# Patient Record
Sex: MALE | NOT HISPANIC OR LATINO | Employment: FULL TIME | URBAN - METROPOLITAN AREA
[De-identification: names, ages, dates, MRNs, and addresses within clinical notes are randomized per-mention and may not be internally consistent; named-entity substitution may affect disease eponyms.]

---

## 2017-03-17 ENCOUNTER — OFFICE VISIT (OUTPATIENT)
Dept: MEDICAL GROUP | Facility: MEDICAL CENTER | Age: 27
End: 2017-03-17
Payer: OTHER MISCELLANEOUS

## 2017-03-17 VITALS
TEMPERATURE: 98.1 F | RESPIRATION RATE: 16 BRPM | BODY MASS INDEX: 28.6 KG/M2 | HEART RATE: 68 BPM | OXYGEN SATURATION: 99 % | WEIGHT: 230 LBS | SYSTOLIC BLOOD PRESSURE: 122 MMHG | DIASTOLIC BLOOD PRESSURE: 64 MMHG | HEIGHT: 75 IN

## 2017-03-17 DIAGNOSIS — M54.9 UPPER BACK PAIN: ICD-10-CM

## 2017-03-17 PROCEDURE — 99214 OFFICE O/P EST MOD 30 MIN: CPT | Performed by: NURSE PRACTITIONER

## 2017-03-17 RX ORDER — CYCLOBENZAPRINE HCL 5 MG
5-10 TABLET ORAL NIGHTLY PRN
Qty: 20 TAB | Refills: 0 | Status: SHIPPED | OUTPATIENT
Start: 2017-03-17 | End: 2019-08-20

## 2017-03-17 NOTE — MR AVS SNAPSHOT
"Lebron Mcdonough   3/17/2017 10:00 AM   Office Visit   MRN: [id number]    Department:  [facility]   Dept Phone:  [phone]    Description:  Male : 1990   Provider:  ELIANA Ramírez           Reason for Visit     Back Pain mid upper back      Allergies as of 3/17/2017     No Known Allergies      Vital Signs     Blood Pressure Pulse Temperature Respirations Height Weight    122/64 mmHg 68 36.7 °C (98.1 °F) 16 1.905 m (6' 3\") 104.327 kg (230 lb)    Body Mass Index Oxygen Saturation Smoking Status             28.75 kg/m2 99% Former Smoker         Basic Information     Date Of Birth Sex Race Ethnicity Preferred Language    1990 Male Unknown Non- English      Problem List              ICD-10-CM Priority Class Noted - Resolved    Nausea R11.0   2016 - Present    Medical marijuana use Z79.899   2016 - Present    Quit smoking Z87.898   2016 - Present    Anxiety and depression F41.9, F32.9   2016 - Present    Gastroesophageal reflux disease without esophagitis K21.9   2016 - Present      Health Maintenance        Date Due Completion Dates    IMM DTaP/Tdap/Td Vaccine (1 - Tdap) 10/6/2007 ---    IMM INFLUENZA (1) 2016 ---            Current Immunizations     No immunizations on file.      Below and/or attached are the medications your provider expects you to take. Review all of your home medications and newly ordered medications with your provider and/or pharmacist. Follow medication instructions as directed by your provider and/or pharmacist. Please keep your medication list with you and share with your provider. Update the information when medications are discontinued, doses are changed, or new medications (including over-the-counter products) are added; and carry medication information at all times in the event of emergency situations     Allergies:  No Known Allergies          Medications  Valid as of: 2017 - 10:12 AM    Generic Name Brand Name " Tablet Size Instructions for use    Citalopram Hydrobromide (Tab) CELEXA 10 MG Take 1 Tab by mouth every day.        Ondansetron (TABLET DISPERSIBLE) ZOFRAN ODT 4 MG Take 1 Tab by mouth every 8 hours as needed for Nausea/Vomiting.        .                 Medicines prescribed today were sent to:     [facility], [place] - [address]    [address] [address]    Phone: [phone] Fax: [phone]    Open 24 Hours?: No      Medication refill instructions:       If your prescription bottle indicates you have medication refills left, it is not necessary to call your provider’s office. Please contact your pharmacy and they will refill your medication.    If your prescription bottle indicates you do not have any refills left, you may request refills at any time through one of the following ways: The online [employer] system (except Urgent Care), by calling your provider’s office, or by asking your pharmacy to contact your provider’s office with a refill request. Medication refills are processed only during regular business hours and may not be available until the next business day. Your provider may request additional information or to have a follow-up visit with you prior to refilling your medication.   *Please Note: Medication refills are assigned a new Rx number when refilled electronically. Your pharmacy may indicate that no refills were authorized even though a new prescription for the same medication is available at the pharmacy. Please request the medicine by name with the pharmacy before contacting your provider for a refill.           Barcoding Access Code: Activation code not generated  Current Barcoding Status: Active

## 2017-04-12 ENCOUNTER — HOSPITAL ENCOUNTER (OUTPATIENT)
Dept: RADIOLOGY | Facility: MEDICAL CENTER | Age: 27
End: 2017-04-12
Attending: NURSE PRACTITIONER
Payer: OTHER MISCELLANEOUS

## 2017-04-12 ENCOUNTER — PATIENT MESSAGE (OUTPATIENT)
Dept: MEDICAL GROUP | Facility: MEDICAL CENTER | Age: 27
End: 2017-04-12

## 2017-04-12 DIAGNOSIS — M54.9 UPPER BACK PAIN: ICD-10-CM

## 2017-04-12 PROCEDURE — 72070 X-RAY EXAM THORAC SPINE 2VWS: CPT

## 2017-04-12 NOTE — TELEPHONE ENCOUNTER
From: Lebron Mcdonough  To: ELIANA Ramírez  Sent: 4/12/2017 10:01 AM PDT  Subject: Non-Urgent Medical Question    Good morning Dr. Dickey,    My back is still really bothering me in the same spot. Still no searing hot/shooting pain but something isn't right. Should I come see you? Thank you for your time.        Lebron Mcdonough

## 2017-04-18 ENCOUNTER — PATIENT MESSAGE (OUTPATIENT)
Dept: MEDICAL GROUP | Facility: MEDICAL CENTER | Age: 27
End: 2017-04-18

## 2017-04-18 NOTE — TELEPHONE ENCOUNTER
From: Lebron Mcdonough  To: ELIANA Ramírez  Sent: 4/18/2017 9:47 AM PDT  Subject: Non-Urgent Medical Question    Hello Dr. Dickey,    Unfortunately I had to cancel my physical therapy appointment as my insurance does not cover any cost until I reach my $2,900 deductible and I don't have $318.00 dollars to pay out of pocket for each visit. I will do the best I can from home and hopefully be able to afford some visits in the future. Thank you for responding to me so quickly.      Lebron

## 2017-04-19 ENCOUNTER — APPOINTMENT (OUTPATIENT)
Dept: PHYSICAL THERAPY | Facility: REHABILITATION | Age: 27
End: 2017-04-19
Attending: NURSE PRACTITIONER
Payer: OTHER MISCELLANEOUS

## 2017-04-21 ENCOUNTER — APPOINTMENT (OUTPATIENT)
Dept: PHYSICAL THERAPY | Facility: REHABILITATION | Age: 27
End: 2017-04-21
Attending: NURSE PRACTITIONER
Payer: OTHER MISCELLANEOUS

## 2017-04-25 ENCOUNTER — APPOINTMENT (OUTPATIENT)
Dept: PHYSICAL THERAPY | Facility: REHABILITATION | Age: 27
End: 2017-04-25
Attending: NURSE PRACTITIONER
Payer: OTHER MISCELLANEOUS

## 2017-04-27 ENCOUNTER — APPOINTMENT (OUTPATIENT)
Dept: PHYSICAL THERAPY | Facility: REHABILITATION | Age: 27
End: 2017-04-27
Attending: NURSE PRACTITIONER
Payer: OTHER MISCELLANEOUS

## 2017-05-02 ENCOUNTER — APPOINTMENT (OUTPATIENT)
Dept: PHYSICAL THERAPY | Facility: REHABILITATION | Age: 27
End: 2017-05-02
Attending: NURSE PRACTITIONER
Payer: OTHER MISCELLANEOUS

## 2017-05-04 ENCOUNTER — APPOINTMENT (OUTPATIENT)
Dept: PHYSICAL THERAPY | Facility: REHABILITATION | Age: 27
End: 2017-05-04
Attending: NURSE PRACTITIONER
Payer: OTHER MISCELLANEOUS

## 2017-05-09 ENCOUNTER — APPOINTMENT (OUTPATIENT)
Dept: PHYSICAL THERAPY | Facility: REHABILITATION | Age: 27
End: 2017-05-09
Attending: NURSE PRACTITIONER
Payer: OTHER MISCELLANEOUS

## 2017-05-11 ENCOUNTER — APPOINTMENT (OUTPATIENT)
Dept: PHYSICAL THERAPY | Facility: REHABILITATION | Age: 27
End: 2017-05-11
Attending: NURSE PRACTITIONER
Payer: OTHER MISCELLANEOUS

## 2017-07-24 RX ORDER — CITALOPRAM HYDROBROMIDE 10 MG/1
TABLET ORAL
Qty: 30 TAB | Refills: 11 | Status: SHIPPED | OUTPATIENT
Start: 2017-07-24 | End: 2017-08-10 | Stop reason: SDUPTHER

## 2017-07-24 NOTE — TELEPHONE ENCOUNTER
Was the patient seen in the last year in this department? Yes     Does patient have an active prescription for medications requested? No     Received Request Via: Pharmacy     Last seen: 03/17/2017 Noble

## 2017-08-10 ENCOUNTER — OFFICE VISIT (OUTPATIENT)
Dept: MEDICAL GROUP | Facility: MEDICAL CENTER | Age: 27
End: 2017-08-10
Payer: COMMERCIAL

## 2017-08-10 VITALS
DIASTOLIC BLOOD PRESSURE: 76 MMHG | SYSTOLIC BLOOD PRESSURE: 124 MMHG | OXYGEN SATURATION: 96 % | RESPIRATION RATE: 16 BRPM | HEIGHT: 75 IN | WEIGHT: 229 LBS | BODY MASS INDEX: 28.47 KG/M2 | HEART RATE: 62 BPM | TEMPERATURE: 98 F

## 2017-08-10 DIAGNOSIS — Z72.0 TOBACCO USE: ICD-10-CM

## 2017-08-10 DIAGNOSIS — Z23 NEED FOR VACCINATION: ICD-10-CM

## 2017-08-10 DIAGNOSIS — F32.A ANXIETY AND DEPRESSION: ICD-10-CM

## 2017-08-10 DIAGNOSIS — Z00.00 ANNUAL PHYSICAL EXAM: ICD-10-CM

## 2017-08-10 DIAGNOSIS — F41.9 ANXIETY AND DEPRESSION: ICD-10-CM

## 2017-08-10 DIAGNOSIS — Z91.09 ENVIRONMENTAL ALLERGIES: ICD-10-CM

## 2017-08-10 PROCEDURE — 90471 IMMUNIZATION ADMIN: CPT | Performed by: NURSE PRACTITIONER

## 2017-08-10 PROCEDURE — 99999 PR NO CHARGE: CPT | Performed by: NURSE PRACTITIONER

## 2017-08-10 PROCEDURE — 90715 TDAP VACCINE 7 YRS/> IM: CPT | Performed by: NURSE PRACTITIONER

## 2017-08-10 PROCEDURE — 99395 PREV VISIT EST AGE 18-39: CPT | Mod: 25 | Performed by: NURSE PRACTITIONER

## 2017-08-10 RX ORDER — CITALOPRAM HYDROBROMIDE 10 MG/1
10 TABLET ORAL DAILY
Qty: 30 TAB | Refills: 11 | Status: SHIPPED | OUTPATIENT
Start: 2017-08-10 | End: 2019-08-20

## 2017-08-10 ASSESSMENT — PATIENT HEALTH QUESTIONNAIRE - PHQ9: CLINICAL INTERPRETATION OF PHQ2 SCORE: 0

## 2017-08-10 NOTE — MR AVS SNAPSHOT
"Lebron Mcdonough   8/10/2017 2:20 PM   Office Visit   MRN: [id number]    Department:  [facility]   Dept Phone:  [phone]    Description:  Male : 1990   Provider:  ELIANA Ramírez           Reason for Visit     Annual Exam           Allergies as of 8/10/2017     No Known Allergies      You were diagnosed with     Annual physical exam   [190384]       Anxiety and depression   [948964]       Tobacco use   [866102]       Environmental allergies   [598175]       Need for vaccination   [871689]         Vital Signs     Blood Pressure Pulse Temperature Respirations Height Weight    124/76 mmHg 62 36.7 °C (98 °F) 16 1.905 m (6' 3\") 103.874 kg (229 lb)    Body Mass Index Oxygen Saturation Smoking Status             28.62 kg/m2 96% Current Some Day Smoker         Basic Information     Date Of Birth Sex Race Ethnicity Preferred Language    1990 Male Unknown Non- English      Problem List              ICD-10-CM Priority Class Noted - Resolved    Medical marijuana use Z79.899   2016 - Present    Anxiety and depression F41.9, F32.9   2016 - Present    Gastroesophageal reflux disease without esophagitis K21.9   2016 - Present    Tobacco use Z72.0   8/10/2017 - Present    Environmental allergies Z91.09   8/10/2017 - Present      Health Maintenance        Date Due Completion Dates    IMM DTaP/Tdap/Td Vaccine (2 - Tdap) 10/6/2007 2004, 1994, 1990, 1989, 3/6/1989, 1988    IMM INFLUENZA (1) 2017 ---            Current Immunizations     DTP 1994, 1990, 1989, 3/6/1989, 1988    Hepatitis A Vaccine, Ped/Adol 2007    Hepatitis B Vaccine Non-Recombivax (Ped/Adol) 2007, 2004    Hib Vaccine (Prp-d) Historical Data 1990    MMR Vaccine 1994, 1990    OPV - Historical Data 1994, 1990, 3/6/1989, 1988    TD Vaccine 2004    Tdap Vaccine 8/10/2017      Below and/or attached are the medications your " provider expects you to take. Review all of your home medications and newly ordered medications with your provider and/or pharmacist. Follow medication instructions as directed by your provider and/or pharmacist. Please keep your medication list with you and share with your provider. Update the information when medications are discontinued, doses are changed, or new medications (including over-the-counter products) are added; and carry medication information at all times in the event of emergency situations     Allergies:  No Known Allergies          Medications  Valid as of: August 10, 2017 -  3:53 PM    Generic Name Brand Name Tablet Size Instructions for use    Citalopram Hydrobromide (Tab) CELEXA 10 MG Take 1 Tab by mouth every day.        Cyclobenzaprine HCl (Tab) FLEXERIL 5 MG Take 1-2 Tabs by mouth at bedtime as needed.        Ondansetron (TABLET DISPERSIBLE) ZOFRAN ODT 4 MG Take 1 Tab by mouth every 8 hours as needed for Nausea/Vomiting.        .                 Medicines prescribed today were sent to:     [id number] - [place], [place] - [address] [address]    Phone: [phone] Fax: [phone]    Open 24 Hours?: No      Medication refill instructions:       If your prescription bottle indicates you have medication refills left, it is not necessary to call your provider’s office. Please contact your pharmacy and they will refill your medication.    If your prescription bottle indicates you do not have any refills left, you may request refills at any time through one of the following ways: The online [employer] system (except Urgent Care), by calling your provider’s office, or by asking your pharmacy to contact your provider’s office with a refill request. Medication refills are processed only during regular business hours and may not be available until the next business day. Your provider may request additional information or to have a follow-up visit  with you prior to refilling your medication.   *Please Note: Medication refills are assigned a new Rx number when refilled electronically. Your pharmacy may indicate that no refills were authorized even though a new prescription for the same medication is available at the pharmacy. Please request the medicine by name with the pharmacy before contacting your provider for a refill.        Your To Do List     Future Labs/Procedures Complete By Expires    CBC WITH DIFFERENTIAL  As directed 8/11/2018    COMP METABOLIC PANEL  As directed 8/11/2018    LIPID PROFILE  As directed 8/11/2018    TSH WITH REFLEX TO FT4  As directed 8/10/2018         MyChart Access Code: Activation code not generated  Current MyChart Status: Active          Quit Tobacco Information     Do you want to quit using tobacco?    Quitting tobacco decreases risks of cancer, heart and lung disease, increases life expectancy, improves sense of taste and smell, and increases spending money, among other benefits.    If you are thinking about quitting, we can help.  • [employer] Quit Tobacco Program: 280.332.2094  o Program occurs weekly for four weeks and includes pharmacist consultation on products to support quitting smoking or chewing tobacco. A provider referral is needed for pharmacist consultation.  • Tobacco Users Help Hotline: 6-018-QUIT-NOW (585-9722) or https://[place].quitlogix.org/  o Free, confidential telephone and online coaching for [place] residents. Sessions are designed on a schedule that is convenient for you. Eligible clients receive free nicotine replacement therapy.  • Nationally: www.smokefree.gov  o Information and professional assistance to support both immediate and long-term needs as you become, and remain, a non-smoker. Smokefree.gov allows you to choose the help that best fits your needs.

## 2017-08-10 NOTE — PROGRESS NOTES
Chief Complaint   Patient presents with   • Annual Exam     Lebron Mcdonough is a 26 y.o. male here for annual exam, has no acute complaints. Has not been exercising much recently, admits that his diet could use improvement. He continues working at [employer]. We discussed:  Anxiety and depression  Continues with celexa 10 mg daily  Mood has been stable, less irritable  Anxiety has been has been good  Sleeping well, normal appetite  Tobacco use  Has quit completely for several months but recently has started smoking again on occ, usually if out having drinks- once or twice weekly  Environmental allergies  Nasal congestion, rhinitis  Using occ OTC allergy medication with relief      Current medicines (including changes today)  Current Outpatient Prescriptions   Medication Sig Dispense Refill   • citalopram (CELEXA) 10 MG tablet Take 1 Tab by mouth every day. 30 Tab 11   • cyclobenzaprine (FLEXERIL) 5 MG tablet Take 1-2 Tabs by mouth at bedtime as needed. 20 Tab 0   • ondansetron (ZOFRAN ODT) 4 MG TABLET DISPERSIBLE Take 1 Tab by mouth every 8 hours as needed for Nausea/Vomiting. 10 Tab 0     No current facility-administered medications for this visit.     He  has no past medical history of Asthma, Hyperlipidemia, or Hypertension.  He  has past surgical history that includes tonsillectomy and adenoidectomy and hernia repair.  Social History   Substance Use Topics   • Smoking status: Current Some Day Smoker -- 0.50 packs/day for 8 years     Types: Cigarettes   • Smokeless tobacco: Never Used   • Alcohol Use: 3.0 oz/week     6 Cans of beer per week     Social History     Social History Narrative     Family History   Problem Relation Age of Onset   • Diabetes Mother    • Other Maternal Grandfather      ALS     Family Status   Relation Status Death Age   • Mother Alive    • Father Alive    • Brother Alive    • Brother Alive    • Brother Alive    • Maternal Grandmother Alive    • Maternal Grandfather     •  "Paternal Grandmother Alive    • Paternal Grandfather Alive          ROS  Problems listed discussed above, all other systems reviewed and negative     Objective:     Blood pressure 124/76, pulse 62, temperature 36.7 °C (98 °F), resp. rate 16, height 1.905 m (6' 3\"), weight 103.874 kg (229 lb), SpO2 96 %. Body mass index is 28.62 kg/(m^2).  Physical Exam:  General: Alert, oriented in no acute distress.  Eye contact is good, speech is normal, affect calm  HEENT: perrl, Oral mucosa pink moist, no lesions. Nares patent. TMs gray with good landmarks bilaterally. No cervical or supraclavicular lymphadenopathy, thyroid isthmus palpable without masses or nodules.  Lungs: clear to auscultation bilaterally, good aeration, normal effort. No wheeze/ rhonchi/ rales.  CV: regular rate and rhythm, S1, S2. No murmur, no JVD, no edema.  Pedal pulses 2 + bilaterally  Abdomen: soft, nontender, BS x4, no hepatosplenomegaly.  Ext: color normal, vascularity normal, temperature normal. No rash or lesions.  MS: no point tenderness over spine, no obvious deformity. No joint swelling or redness. Strength is 5/5 globally  Neuro: DTR 2+ bilaterally  Assessment and Plan:   The following treatment plan was discussed  1. Annual physical exam   normal physical exam. Health promotion topics reviewed including healthy diet, regular exercise, sleep weight loss recommended. 2000 international units vitamin D3 advise daily. Tdap updated  COMP METABOLIC PANEL    LIPID PROFILE    CBC WITH DIFFERENTIAL    TSH WITH REFLEX TO FT4   2. Anxiety and depression   stable, continue citalopram    3. Tobacco use   recently started smoking socially again. Cessation encouraged    4. Environmental allergies   stable    5. Need for vaccination  I have placed the below orders and discussed them with an approved delegating provider. The MA is performing the below orders under the direction of Dr. Queen  Tdap =>6yo IM       Educated in proper administration of " medication(s) ordered today including safety, possible SE, risks, benefits, rationale and alternatives to therapy.     Followup: annually and pending labs             Please note that this dictation was created using voice recognition software. I have worked with consultants from the vendor as well as technical experts from [facility] to optimize the interface. I have made every reasonable attempt to correct obvious errors, but I expect that there are errors of grammar and possibly content that I did not discover before finalizing the note.

## 2017-08-10 NOTE — ASSESSMENT & PLAN NOTE
Continues with celexa 10 mg daily  Mood has been stable, less irritable  Anxiety has been has been good  Sleeping well, normal appetite

## 2017-08-10 NOTE — ASSESSMENT & PLAN NOTE
Has quit completely for several months but recently has started smoking again on occ, usually if out having drinks

## 2018-08-17 ENCOUNTER — HOSPITAL ENCOUNTER (OUTPATIENT)
Dept: LAB | Facility: MEDICAL CENTER | Age: 28
End: 2018-08-17
Attending: NURSE PRACTITIONER
Payer: COMMERCIAL

## 2018-08-17 ENCOUNTER — OFFICE VISIT (OUTPATIENT)
Dept: MEDICAL GROUP | Facility: MEDICAL CENTER | Age: 28
End: 2018-08-17
Payer: COMMERCIAL

## 2018-08-17 VITALS
WEIGHT: 218 LBS | TEMPERATURE: 97.5 F | OXYGEN SATURATION: 97 % | SYSTOLIC BLOOD PRESSURE: 112 MMHG | DIASTOLIC BLOOD PRESSURE: 80 MMHG | BODY MASS INDEX: 27.1 KG/M2 | HEIGHT: 75 IN | HEART RATE: 68 BPM | RESPIRATION RATE: 16 BRPM

## 2018-08-17 DIAGNOSIS — Z00.00 ANNUAL PHYSICAL EXAM: ICD-10-CM

## 2018-08-17 DIAGNOSIS — F41.9 ANXIETY AND DEPRESSION: ICD-10-CM

## 2018-08-17 DIAGNOSIS — F32.A ANXIETY AND DEPRESSION: ICD-10-CM

## 2018-08-17 LAB
ALBUMIN SERPL BCP-MCNC: 4.5 G/DL (ref 3.2–4.9)
ALBUMIN/GLOB SERPL: 1.7 G/DL
ALP SERPL-CCNC: 51 U/L (ref 30–99)
ALT SERPL-CCNC: 35 U/L (ref 2–50)
ANION GAP SERPL CALC-SCNC: 6 MMOL/L (ref 0–11.9)
AST SERPL-CCNC: 22 U/L (ref 12–45)
BILIRUB SERPL-MCNC: 0.6 MG/DL (ref 0.1–1.5)
BUN SERPL-MCNC: 15 MG/DL (ref 8–22)
CALCIUM SERPL-MCNC: 9.8 MG/DL (ref 8.5–10.5)
CHLORIDE SERPL-SCNC: 105 MMOL/L (ref 96–112)
CHOLEST SERPL-MCNC: 146 MG/DL (ref 100–199)
CO2 SERPL-SCNC: 27 MMOL/L (ref 20–33)
CREAT SERPL-MCNC: 1.02 MG/DL (ref 0.5–1.4)
GLOBULIN SER CALC-MCNC: 2.6 G/DL (ref 1.9–3.5)
GLUCOSE SERPL-MCNC: 95 MG/DL (ref 65–99)
HDLC SERPL-MCNC: 40 MG/DL
LDLC SERPL CALC-MCNC: 91 MG/DL
POTASSIUM SERPL-SCNC: 4.2 MMOL/L (ref 3.6–5.5)
PROT SERPL-MCNC: 7.1 G/DL (ref 6–8.2)
SODIUM SERPL-SCNC: 138 MMOL/L (ref 135–145)
TRIGL SERPL-MCNC: 74 MG/DL (ref 0–149)
TSH SERPL DL<=0.005 MIU/L-ACNC: 1.58 UIU/ML (ref 0.38–5.33)

## 2018-08-17 PROCEDURE — 84443 ASSAY THYROID STIM HORMONE: CPT

## 2018-08-17 PROCEDURE — 80061 LIPID PANEL: CPT

## 2018-08-17 PROCEDURE — 80053 COMPREHEN METABOLIC PANEL: CPT

## 2018-08-17 PROCEDURE — 99395 PREV VISIT EST AGE 18-39: CPT | Performed by: NURSE PRACTITIONER

## 2018-08-17 PROCEDURE — 36415 COLL VENOUS BLD VENIPUNCTURE: CPT

## 2018-08-17 RX ORDER — ESCITALOPRAM OXALATE 10 MG/1
10 TABLET ORAL DAILY
Qty: 30 TAB | Refills: 1 | Status: SHIPPED | OUTPATIENT
Start: 2018-08-17 | End: 2019-08-20

## 2018-08-17 ASSESSMENT — PATIENT HEALTH QUESTIONNAIRE - PHQ9
SUM OF ALL RESPONSES TO PHQ QUESTIONS 1-9: 7
CLINICAL INTERPRETATION OF PHQ2 SCORE: 3
5. POOR APPETITE OR OVEREATING: 0 - NOT AT ALL

## 2018-08-17 NOTE — PROGRESS NOTES
"Chief Complaint   Patient presents with   • Annual Exam     Lebron Mcdonough is a 27 y.o. male established patient here for annual exam.  He is living with a girlfriend, active but no planned exercise.  Generally healthy diet.  He continues working at [employer].  We discussed:    Anxiety and depression  Discontinued celexa, did not feel it was helping or that he needed it at the time. Mood has been lower, tends to \"over analyze\", can be stressed at times, feels unmotivated are easily overwhelmed.  Interested in trying alternative medication   no SI/HI, no history of manic behavior, no insomnia or appetite changes      Current medicines (including changes today)  Current Outpatient Prescriptions   Medication Sig Dispense Refill   • escitalopram (LEXAPRO) 10 MG Tab Take 1 Tab by mouth every day. 30 Tab 1   • citalopram (CELEXA) 10 MG tablet Take 1 Tab by mouth every day. 30 Tab 11   • cyclobenzaprine (FLEXERIL) 5 MG tablet Take 1-2 Tabs by mouth at bedtime as needed. 20 Tab 0   • ondansetron (ZOFRAN ODT) 4 MG TABLET DISPERSIBLE Take 1 Tab by mouth every 8 hours as needed for Nausea/Vomiting. 10 Tab 0     No current facility-administered medications for this visit.      He  has no past medical history of Asthma; Hyperlipidemia; or Hypertension.  He  has a past surgical history that includes tonsillectomy and adenoidectomy and hernia repair.  Social History   Substance Use Topics   • Smoking status: Current Some Day Smoker     Packs/day: 0.50     Years: 8.00     Types: Cigarettes   • Smokeless tobacco: Never Used   • Alcohol use 3.0 oz/week     6 Cans of beer per week     Social History     Social History Narrative   • No narrative on file     Family History   Problem Relation Age of Onset   • Diabetes Mother    • Other Maternal Grandfather         ALS     Family Status   Relation Status   • Mo Alive   • Fa Alive   • Bro Alive   • Bro Alive   • Bro Alive   • MGMo Alive   • MGFa    • PGMo Alive   • PGFa Alive " "        ROS  Problems listed discussed above, all other systems reviewed and negative     Objective:     Blood pressure 112/80, pulse 68, temperature 36.4 °C (97.5 °F), resp. rate 16, height 1.905 m (6' 3\"), weight 98.9 kg (218 lb), SpO2 97 %. Body mass index is 27.25 kg/m².  Physical Exam:  General: Alert, oriented in no acute distress.  Eye contact is good, speech is normal, affect calm  HEENT: perrl, Oral mucosa pink moist, no lesions. Nares patent. TMs gray with good landmarks bilaterally. No cervical or supraclavicular lymphadenopathy, thyroid isthmus palpable without masses or nodules.  Lungs: clear to auscultation bilaterally, good aeration, normal effort. No wheeze/ rhonchi/ rales.  CV: regular rate and rhythm, S1, S2. No murmur, no JVD, no edema. PMI at 5th intercostal space midclavicular line. Pedal pulses 2 + bilaterally  Abdomen: soft, nontender, BS x4, no hepatosplenomegaly.  Ext: color normal, vascularity normal, temperature normal. No rash or lesions.  MS: No joint swelling or redness. Strength is 5/5 globally  Neuro: DTR 2+ bilaterally  Assessment and Plan:   The following treatment plan was discussed   1. Annual physical exam  Normal physical exam. General health and wellness discussion including healthy diet, regular exercise. 2000 iu Vit d3 advised daily. Preventative health screenings as listed below. Advised regular dental cleanings, eye exam yearly.  TSH WITH REFLEX TO FT4    LIPID PROFILE    COMP METABOLIC PANEL   2. Anxiety and depression   on Celexa in the past, discontinued several months ago.  Interested in alternative medication options.  Will start trial of Lexapro.  Risks benefits and side effects discussed.  Discussed need for sustained therapy to obtain results.  Phone numbers given for counseling services.  Patient to contact me in 1 month with an update, follow-up in the office if still struggling  escitalopram (LEXAPRO) 10 MG Tab       Educated in proper administration of " medication(s) ordered today including safety, possible SE, risks, benefits, rationale and alternatives to therapy.     Followup: Annually and pending labs             Please note that this dictation was created using voice recognition software. I have worked with consultants from the vendor as well as technical experts from [facility] to optimize the interface. I have made every reasonable attempt to correct obvious errors, but I expect that there are errors of grammar and possibly content that I did not discover before finalizing the note.

## 2018-08-17 NOTE — ASSESSMENT & PLAN NOTE
"Discontinued celexa, did not feel it was helping or that he needed it at the time. Mood has been lower, tends to \"over analyze\", can be stressed at times, feels unmotivated are easily overwhelmed.  Interested in trying alternative medication   no SI/HI, no history of manic behavior, no insomnia or appetite changes    "

## 2018-09-21 ENCOUNTER — PATIENT MESSAGE (OUTPATIENT)
Dept: MEDICAL GROUP | Facility: MEDICAL CENTER | Age: 28
End: 2018-09-21

## 2018-09-23 RX ORDER — ESCITALOPRAM OXALATE 20 MG/1
20 TABLET ORAL DAILY
Qty: 90 TAB | Refills: 1 | Status: SHIPPED | OUTPATIENT
Start: 2018-09-23 | End: 2018-11-12 | Stop reason: SDUPTHER

## 2018-09-24 NOTE — TELEPHONE ENCOUNTER
From: Lebron Mcdonough  To: ELIANA Ramírez  Sent: 9/21/2018 12:21 PM PDT  Subject: Prescription Question    Hello Dr. Dickey,    I am contacting you in regard to the Lexapro script that I started just over a month ago. I really like the results I've noticed so far and my body has adjusted to the medication well. Just curious if I may have a higher dose as we discussed initially. Thank you for your time and I look forward to hearing back from you.       Sincerely,    Lebron Mcdonough

## 2018-11-10 ENCOUNTER — PATIENT MESSAGE (OUTPATIENT)
Dept: MEDICAL GROUP | Facility: MEDICAL CENTER | Age: 28
End: 2018-11-10

## 2018-11-12 RX ORDER — ESCITALOPRAM OXALATE 20 MG/1
20 TABLET ORAL DAILY
Qty: 90 TAB | Refills: 1 | Status: SHIPPED | OUTPATIENT
Start: 2018-11-12 | End: 2019-08-20

## 2018-11-12 NOTE — TELEPHONE ENCOUNTER
I currently see active prescription in chart. Don't know if patient wants a new prescription? But I also see refill in chart.

## 2019-08-20 ENCOUNTER — OFFICE VISIT (OUTPATIENT)
Dept: MEDICAL GROUP | Facility: MEDICAL CENTER | Age: 29
End: 2019-08-20
Payer: COMMERCIAL

## 2019-08-20 ENCOUNTER — HOSPITAL ENCOUNTER (OUTPATIENT)
Dept: LAB | Facility: MEDICAL CENTER | Age: 29
End: 2019-08-20
Attending: NURSE PRACTITIONER
Payer: COMMERCIAL

## 2019-08-20 VITALS
BODY MASS INDEX: 27.35 KG/M2 | RESPIRATION RATE: 16 BRPM | WEIGHT: 220 LBS | TEMPERATURE: 98 F | OXYGEN SATURATION: 97 % | HEIGHT: 75 IN | DIASTOLIC BLOOD PRESSURE: 88 MMHG | HEART RATE: 74 BPM | SYSTOLIC BLOOD PRESSURE: 114 MMHG

## 2019-08-20 DIAGNOSIS — Z13.220 LIPID SCREENING: ICD-10-CM

## 2019-08-20 DIAGNOSIS — Z23 NEED FOR VACCINATION: ICD-10-CM

## 2019-08-20 DIAGNOSIS — Z00.00 ANNUAL PHYSICAL EXAM: ICD-10-CM

## 2019-08-20 DIAGNOSIS — Z72.0 TOBACCO USE: ICD-10-CM

## 2019-08-20 LAB
ALBUMIN SERPL BCP-MCNC: 5 G/DL (ref 3.2–4.9)
ALBUMIN/GLOB SERPL: 1.9 G/DL
ALP SERPL-CCNC: 51 U/L (ref 30–99)
ALT SERPL-CCNC: 33 U/L (ref 2–50)
ANION GAP SERPL CALC-SCNC: 9 MMOL/L (ref 0–11.9)
AST SERPL-CCNC: 22 U/L (ref 12–45)
BILIRUB SERPL-MCNC: 1.6 MG/DL (ref 0.1–1.5)
BUN SERPL-MCNC: 10 MG/DL (ref 8–22)
CALCIUM SERPL-MCNC: 9.7 MG/DL (ref 8.5–10.5)
CHLORIDE SERPL-SCNC: 106 MMOL/L (ref 96–112)
CHOLEST SERPL-MCNC: 146 MG/DL (ref 100–199)
CO2 SERPL-SCNC: 25 MMOL/L (ref 20–33)
CREAT SERPL-MCNC: 1.08 MG/DL (ref 0.5–1.4)
FASTING STATUS PATIENT QL REPORTED: NORMAL
GLOBULIN SER CALC-MCNC: 2.6 G/DL (ref 1.9–3.5)
GLUCOSE SERPL-MCNC: 87 MG/DL (ref 65–99)
HDLC SERPL-MCNC: 49 MG/DL
LDLC SERPL CALC-MCNC: 75 MG/DL
POTASSIUM SERPL-SCNC: 4.1 MMOL/L (ref 3.6–5.5)
PROT SERPL-MCNC: 7.6 G/DL (ref 6–8.2)
SODIUM SERPL-SCNC: 140 MMOL/L (ref 135–145)
TRIGL SERPL-MCNC: 111 MG/DL (ref 0–149)

## 2019-08-20 PROCEDURE — 36415 COLL VENOUS BLD VENIPUNCTURE: CPT

## 2019-08-20 PROCEDURE — 90746 HEPB VACCINE 3 DOSE ADULT IM: CPT | Performed by: NURSE PRACTITIONER

## 2019-08-20 PROCEDURE — 80061 LIPID PANEL: CPT

## 2019-08-20 PROCEDURE — 99395 PREV VISIT EST AGE 18-39: CPT | Mod: 25 | Performed by: NURSE PRACTITIONER

## 2019-08-20 PROCEDURE — 90471 IMMUNIZATION ADMIN: CPT | Performed by: NURSE PRACTITIONER

## 2019-08-20 PROCEDURE — 80053 COMPREHEN METABOLIC PANEL: CPT

## 2019-08-20 NOTE — ASSESSMENT & PLAN NOTE
Still occasionally smoking although has cut back significantly.  Ultimately he would like to stop completely but has been struggling with this.  He is interested in trial of Chantix.  No history of kidney disease, seizure disorder, heart disease.  He does have a history of mild anxiety and depression but has been stable for the last several years

## 2019-08-20 NOTE — PROGRESS NOTES
"Subjective:     Chief Complaint   Patient presents with   • Annual Exam     Lebron Mcdonough is a 28 y.o. male here today for annual exam.  He is working in property management in [place], living with his girlfriend who is graduating from nursing school soon.  We discussed:    Tobacco use  Still occasionally smoking although has cut back significantly.  Ultimately he would like to stop completely but has been struggling with this.  He is interested in trial of Chantix.  No history of kidney disease, seizure disorder, heart disease.  He does have a history of mild anxiety and depression but has been stable for the last several years       Current medicines (including changes today)  Current Outpatient Medications   Medication Sig Dispense Refill   • Cholecalciferol (VITAMIN D PO) Take  by mouth.     • Omega-3 Fatty Acids (FISH OIL PO) Take  by mouth.     • varenicline (CHANTIX STARTING MONTH PAK) 0.5 MG X 11 & 1 MG X 42 tablet 0.5 mg PO daily x 3 days then 0.5 mg PO BID x 4 days then 1 mg PO BID 56 Tab 0     No current facility-administered medications for this visit.      He  has no past medical history of Asthma, Hyperlipidemia, or Hypertension.    ROS included above     Objective:     /88 (BP Location: Left arm, Patient Position: Sitting, BP Cuff Size: Adult)   Pulse 74   Temp 36.7 °C (98 °F) (Temporal)   Resp 16   Ht 1.905 m (6' 3\")   Wt 99.8 kg (220 lb)   SpO2 97%  Body mass index is 27.5 kg/m².     Physical Exam:  General: Alert, oriented in no acute distress.  Eye contact is good, speech is normal, affect calm  HEENT: Oral mucosa pink moist, no lesions. TMs gray with good landmarks bilaterally. No lymphadenopathy.  Lungs: clear to auscultation bilaterally, normal effort, no wheeze/ rhonchi/ rales.  CV: regular rate and rhythm, S1, S2, no murmur.  No JVD, no pedal edema  Abdomen: soft, nontender, No CVAT, no hepatosplenomegaly  MS: No red or swollen joints  Ext: no edema, color normal, " vascularity normal, temperature normal    Assessment and Plan:   The following treatment plan was discussed  1. Annual physical exam  Normal physical exam. General health and wellness discussion including healthy diet, regular exercise. Preventative health screenings as listed below. Advised regular dental cleanings, eye exam yearly.    Comp Metabolic Panel   2. Tobacco use   he would like to quit smoking.  Benefits of Chantix reviewed, risks and side effects discussed.  Will start trial  varenicline (CHANTIX STARTING MONTH ISA) 0.5 MG X 11 & 1 MG X 42 tablet  Advised to set quit date 7 to 10 days after starting medication.  He will call me for second month of medication if doing well   3. Need for vaccination  I have placed the below orders and discussed them with an approved delegating provider. The MA is performing the below orders under the direction of Dr. Luna  Hepatitis B Vaccine Adult 20+   4. Lipid screening  Lipid Profile       Followup: pending labs         Please note that this dictation was created using voice recognition software. I have worked with consultants from the vendor as well as technical experts from [employer] to optimize the interface. I have made every reasonable attempt to correct obvious errors, but I expect that there are errors of grammar and possibly content that I did not discover before finalizing the note.

## 2019-08-29 ENCOUNTER — TELEPHONE (OUTPATIENT)
Dept: MEDICAL GROUP | Facility: MEDICAL CENTER | Age: 29
End: 2019-08-29

## 2019-08-29 NOTE — TELEPHONE ENCOUNTER
LVM for pt to notify him of mychart message regarding chantix denial. Will await response. Terrie DIXON